# Patient Record
Sex: MALE | Race: WHITE | Employment: FULL TIME | RURAL
[De-identification: names, ages, dates, MRNs, and addresses within clinical notes are randomized per-mention and may not be internally consistent; named-entity substitution may affect disease eponyms.]

---

## 2022-05-29 ENCOUNTER — HOSPITAL ENCOUNTER (EMERGENCY)
Facility: HOSPITAL | Age: 27
Discharge: HOME | End: 2022-05-29
Payer: COMMERCIAL

## 2022-05-29 VITALS
DIASTOLIC BLOOD PRESSURE: 102 MMHG | BODY MASS INDEX: 26.95 KG/M2 | WEIGHT: 192.5 LBS | HEART RATE: 88 BPM | RESPIRATION RATE: 16 BRPM | OXYGEN SATURATION: 99 % | HEIGHT: 71 IN | SYSTOLIC BLOOD PRESSURE: 174 MMHG | TEMPERATURE: 99 F

## 2022-05-29 DIAGNOSIS — S05.01XA ABRASION OF RIGHT CORNEA, INITIAL ENCOUNTER: Primary | ICD-10-CM

## 2022-05-29 PROCEDURE — 99283 PR EMERGENCY DEPT VISIT,LEVEL III: ICD-10-PCS | Mod: ,,, | Performed by: NURSE PRACTITIONER

## 2022-05-29 PROCEDURE — 25000003 PHARM REV CODE 250: Performed by: NURSE PRACTITIONER

## 2022-05-29 PROCEDURE — 99283 EMERGENCY DEPT VISIT LOW MDM: CPT | Mod: ,,, | Performed by: NURSE PRACTITIONER

## 2022-05-29 PROCEDURE — 99283 EMERGENCY DEPT VISIT LOW MDM: CPT

## 2022-05-29 RX ORDER — ERYTHROMYCIN 5 MG/G
OINTMENT OPHTHALMIC
Status: COMPLETED | OUTPATIENT
Start: 2022-05-29 | End: 2022-05-29

## 2022-05-29 RX ORDER — TETRACAINE HYDROCHLORIDE 5 MG/ML
2 SOLUTION OPHTHALMIC
Status: COMPLETED | OUTPATIENT
Start: 2022-05-29 | End: 2022-05-29

## 2022-05-29 RX ORDER — ERYTHROMYCIN 5 MG/G
OINTMENT OPHTHALMIC
Qty: 3.5 G | Refills: 0 | Status: SHIPPED | OUTPATIENT
Start: 2022-05-29

## 2022-05-29 RX ADMIN — ERYTHROMYCIN 1 INCH: 5 OINTMENT OPHTHALMIC at 01:05

## 2022-05-29 RX ADMIN — FLUORESCEIN SODIUM 1 EACH: 1 STRIP OPHTHALMIC at 01:05

## 2022-05-29 RX ADMIN — TETRACAINE HYDROCHLORIDE 2 DROP: 5 SOLUTION OPHTHALMIC at 01:05

## 2022-05-29 NOTE — Clinical Note
"Vladislav Sigalashorty" Jordan was seen and treated in our emergency department on 5/29/2022.  He may return to work on 06/01/2022.       If you have any questions or concerns, please don't hesitate to call.      TERRA Dixon"

## 2022-05-29 NOTE — ED PROVIDER NOTES
Encounter Date: 5/29/2022       History     Chief Complaint   Patient presents with    Eye Pain     Right eye pain since 10 am     Patient presents to ER with complaint of right eye pain.  Patient was cutting wood and saw dust or wood chip hit his right eye.  He washed out immediately but eye is became painful and irritated.  Bright light light makes pain worse.  Right eye is red and draining clear tears. No other injuries.  Patient appears to be uncomfortable and right eye painful.     The history is provided by the patient. No  was used.     Review of patient's allergies indicates:  No Known Allergies  No past medical history on file.  No past surgical history on file.  No family history on file.     Review of Systems   Eyes: Positive for pain, discharge and redness.   All other systems reviewed and are negative.      Physical Exam     Initial Vitals [05/29/22 1300]   BP Pulse Resp Temp SpO2   (!) 174/102 88 16 98.6 °F (37 °C) 99 %      MAP       --         Physical Exam    Nursing note and vitals reviewed.  Constitutional: He appears well-developed and well-nourished.   HENT:   Head: Normocephalic.   Right Ear: External ear normal.   Left Ear: External ear normal.   Nose: Nose normal.   Mouth/Throat: Oropharynx is clear and moist.   Eyes: EOM and lids are normal. Pupils are equal, round, and reactive to light. Lids are everted and swept, no foreign bodies found. Right eye exhibits discharge.   Slit lamp exam:       The right eye shows corneal abrasion.       Neck: Neck supple.   Normal range of motion.  Cardiovascular: Normal rate, regular rhythm, normal heart sounds and intact distal pulses.   Pulmonary/Chest: Breath sounds normal.   Abdominal: Abdomen is soft. Bowel sounds are normal.   Musculoskeletal:         General: Normal range of motion.      Cervical back: Normal range of motion and neck supple.     Neurological: He is oriented to person, place, and time. He has normal strength. GCS  score is 15. GCS eye subscore is 4. GCS verbal subscore is 5. GCS motor subscore is 6.   Skin: Skin is warm and dry.   Psychiatric: He has a normal mood and affect. His behavior is normal. Judgment and thought content normal.         Medical Screening Exam   See Full Note    ED Course   Procedures  Labs Reviewed - No data to display       Imaging Results    None          Medications   TETRAcaine HCl (PF) 0.5 % Drop 2 drop (2 drops Right Eye Given 5/29/22 1343)   fluorescein ophthalmic strip 1 each (1 each Right Eye Given 5/29/22 1346)   erythromycin 5 mg/gram (0.5 %) ophthalmic ointment (1 inch Right Eye Given 5/29/22 1344)                       Clinical Impression:   Final diagnoses:  [S05.01XA] Abrasion of right cornea, initial encounter (Primary)          ED Disposition Condition    Discharge Stable        ED Prescriptions     Medication Sig Dispense Start Date End Date Auth. Provider    erythromycin (ROMYCIN) ophthalmic ointment Place a 1/2 inch ribbon of ointment into the lower eyelid of right eye x 7 days. 3.5 g 5/29/2022  TERRA Dixon        Follow-up Information    None          TERRA Dixon  05/29/22 1357       TERRA Dixon  05/29/22 1357

## 2022-05-29 NOTE — DISCHARGE INSTRUCTIONS
Wear patch x 24 hours.   Use ointment 2 x daily right eye.   Follow up with your eye doctor (Dr Glasgow) in 24- 48 hours.   Return to ER with new or worsening symptoms.

## 2022-05-29 NOTE — ED TRIAGE NOTES
States that he got some wood & sawdust in his right eye emory. 10 am today. States that he irrigated his eye but is still having problems. Pt is suppose to wear glasses but does not have them with him today.